# Patient Record
Sex: FEMALE | URBAN - METROPOLITAN AREA
[De-identification: names, ages, dates, MRNs, and addresses within clinical notes are randomized per-mention and may not be internally consistent; named-entity substitution may affect disease eponyms.]

---

## 2019-07-05 ENCOUNTER — APPOINTMENT (RX ONLY)
Dept: URBAN - METROPOLITAN AREA CLINIC 73 | Facility: CLINIC | Age: 34
Setting detail: DERMATOLOGY
End: 2019-07-05

## 2019-07-05 DIAGNOSIS — L56.2 PHOTOCONTACT DERMATITIS [BERLOQUE DERMATITIS]: ICD-10-CM

## 2019-07-05 DIAGNOSIS — L81.3 CAFÉ AU LAIT SPOTS: ICD-10-CM

## 2019-07-05 DIAGNOSIS — L72.8 OTHER FOLLICULAR CYSTS OF THE SKIN AND SUBCUTANEOUS TISSUE: ICD-10-CM

## 2019-07-05 PROBLEM — D48.5 NEOPLASM OF UNCERTAIN BEHAVIOR OF SKIN: Status: ACTIVE | Noted: 2019-07-05

## 2019-07-05 PROBLEM — J45.909 UNSPECIFIED ASTHMA, UNCOMPLICATED: Status: ACTIVE | Noted: 2019-07-05

## 2019-07-05 PROBLEM — D23.5 OTHER BENIGN NEOPLASM OF SKIN OF TRUNK: Status: ACTIVE | Noted: 2019-07-05

## 2019-07-05 PROCEDURE — ? COUNSELING

## 2019-07-05 PROCEDURE — ? OTHER

## 2019-07-05 PROCEDURE — ? OBSERVATION AND MEASURE

## 2019-07-05 PROCEDURE — 99203 OFFICE O/P NEW LOW 30 MIN: CPT

## 2019-07-05 PROCEDURE — ? PATIENT SPECIFIC COUNSELING

## 2019-07-05 PROCEDURE — ? TREATMENT REGIMEN

## 2019-07-05 ASSESSMENT — LOCATION DETAILED DESCRIPTION DERM
LOCATION DETAILED: LEFT PROXIMAL PRETIBIAL REGION
LOCATION DETAILED: MID POSTERIOR NECK
LOCATION DETAILED: LEFT DISTAL PRETIBIAL REGION

## 2019-07-05 ASSESSMENT — LOCATION ZONE DERM
LOCATION ZONE: LEG
LOCATION ZONE: NECK

## 2019-07-05 ASSESSMENT — PAIN INTENSITY VAS: HOW INTENSE IS YOUR PAIN 0 BEING NO PAIN, 10 BEING THE MOST SEVERE PAIN POSSIBLE?: NO PAIN

## 2019-07-05 ASSESSMENT — LOCATION SIMPLE DESCRIPTION DERM
LOCATION SIMPLE: LEFT PRETIBIAL REGION
LOCATION SIMPLE: POSTERIOR NECK

## 2019-07-05 NOTE — HPI: SKIN LESION
Is This A New Presentation, Or A Follow-Up?: Skin Lesion
How Severe Is Your Skin Lesion?: moderate
Additional History: Patient denies lesion itches, bleeds, or is changing color. Patient mentioned the lesion might be getting larger.
Additional History: Patient saw a dermatologist one year ago. He informed patient the lesion was caused from injury and to remove the lesion would not be worth it due to possibly having the same scarring discoloration.

## 2019-07-05 NOTE — PROCEDURE: PATIENT SPECIFIC COUNSELING
Disc skin is not changed. Disc only feel a bump underneath the skin.\\nDisc could be enlarging cyst vs flesh colored mole starting to raise vs an ingrown hair that turned into a ’s papule. \\nInformed patient more likely the first two diagnosis. \\nWill give patient topical steroid. Disc the steroid may or may not help. Disc if a mole then it will not disappear unless removed. \\nIf bothersome, can remove by shave biopsy.
Detail Level: Zone

## 2019-07-05 NOTE — PROCEDURE: OTHER
Other (Free Text): Disc DF is can be caused by injury, ingrown hairs, or bug bites. \\nInformed pt that it is normal that the area is dark.\\nDisc if bothersome, can remove with punch biopsy.
Note Text (......Xxx Chief Complaint.): This diagnosis correlates with the
Detail Level: Simple
Other (Free Text): Disc certain fruits limes, parsley, celery, or figs\\nDisc if changes or grows, pt will RTC.\\nPt denies lesion is itchy or a rash. \\nPt can use topical steroid bid x 7-10 days

## 2019-07-05 NOTE — PROCEDURE: TREATMENT REGIMEN
Detail Level: Zone
Initiate Treatment: Bryhali apply to affected areas bid x 7-10 days
Samples Given: Bryhali x 1